# Patient Record
Sex: MALE | Race: WHITE | NOT HISPANIC OR LATINO | URBAN - METROPOLITAN AREA
[De-identification: names, ages, dates, MRNs, and addresses within clinical notes are randomized per-mention and may not be internally consistent; named-entity substitution may affect disease eponyms.]

---

## 2017-12-23 ENCOUNTER — EMERGENCY (EMERGENCY)
Facility: HOSPITAL | Age: 77
LOS: 1 days | Discharge: HOME | End: 2017-12-23
Attending: EMERGENCY MEDICINE | Admitting: EMERGENCY MEDICINE
Payer: MEDICARE

## 2017-12-23 VITALS
DIASTOLIC BLOOD PRESSURE: 77 MMHG | WEIGHT: 162.04 LBS | SYSTOLIC BLOOD PRESSURE: 134 MMHG | TEMPERATURE: 98 F | HEIGHT: 70 IN | RESPIRATION RATE: 16 BRPM | HEART RATE: 80 BPM | OXYGEN SATURATION: 98 %

## 2017-12-23 VITALS
OXYGEN SATURATION: 98 % | DIASTOLIC BLOOD PRESSURE: 74 MMHG | SYSTOLIC BLOOD PRESSURE: 134 MMHG | RESPIRATION RATE: 18 BRPM | HEART RATE: 80 BPM
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
## 2017-12-23 DIAGNOSIS — S01.01XA LACERATION WITHOUT FOREIGN BODY OF SCALP, INITIAL ENCOUNTER: ICD-10-CM

## 2017-12-23 DIAGNOSIS — E78.00 PURE HYPERCHOLESTEROLEMIA, UNSPECIFIED: ICD-10-CM

## 2017-12-23 DIAGNOSIS — Z23 ENCOUNTER FOR IMMUNIZATION: ICD-10-CM
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
## 2017-12-23 PROCEDURE — 12001 RPR S/N/AX/GEN/TRNK 2.5CM/<: CPT

## 2017-12-23 PROCEDURE — 70450 CT HEAD/BRAIN W/O DYE: CPT | Mod: 26

## 2017-12-23 PROCEDURE — 90715 TDAP VACCINE 7 YRS/> IM: CPT

## 2017-12-23 PROCEDURE — 99284 EMERGENCY DEPT VISIT MOD MDM: CPT | Mod: 25

## 2017-12-23 PROCEDURE — 70450 CT HEAD/BRAIN W/O DYE: CPT

## 2017-12-23 PROCEDURE — 90471 IMMUNIZATION ADMIN: CPT

## 2017-12-23 RX ORDER — BACITRACIN ZINC 500 UNIT/G
1 OINTMENT IN PACKET (EA) TOPICAL ONCE
Qty: 0 | Refills: 0 | Status: COMPLETED | OUTPATIENT
Start: 2017-12-23 | End: 2017-12-23

## 2017-12-23 RX ORDER — TETANUS TOXOID, REDUCED DIPHTHERIA TOXOID AND ACELLULAR PERTUSSIS VACCINE, ADSORBED 5; 2.5; 8; 8; 2.5 [IU]/.5ML; [IU]/.5ML; UG/.5ML; UG/.5ML; UG/.5ML
0.5 SUSPENSION INTRAMUSCULAR ONCE
Qty: 0 | Refills: 0 | Status: COMPLETED | OUTPATIENT
Start: 2017-12-23 | End: 2017-12-23

## 2017-12-23 RX ADMIN — TETANUS TOXOID, REDUCED DIPHTHERIA TOXOID AND ACELLULAR PERTUSSIS VACCINE, ADSORBED 0.5 MILLILITER(S): 5; 2.5; 8; 8; 2.5 SUSPENSION INTRAMUSCULAR at 10:45

## 2017-12-23 RX ADMIN — Medication 1 APPLICATION(S): at 10:46

## 2017-12-23 NOTE — ED PROVIDER NOTE - MEDICAL DECISION MAKING DETAILS
No ICH of skull fx, laceration cleaned, irrigated and closed w/staples, sterile dressing applied, neuro intact feeling well, safe to d/c home with PMD f/u. given wound care instructions, verbalized understanding.

## 2017-12-23 NOTE — ED PROVIDER NOTE - PHYSICAL EXAMINATION
VITAL SIGNS: I have reviewed nursing notes and confirm.  CONSTITUTIONAL: Well-developed; well-nourished; in no acute distress.  SKIN: Agree with RN documentation regarding decubitus evaluation. Remainder of skin exam is warm and dry, no acute rash.  HEAD: Normocephalic; + 4cm linear laceration deep to fascia occipital scalp w/out foreign body or pulsatile bleeding, no skeletal abnormality appreciated  EYES: PERRL, EOM intact; conjunctiva and sclera clear.  ENT: No nasal discharge; airway clear.  NECK: Supple; non tender.  CARD: S1, S2 normal; no murmurs, gallops, or rubs. Regular rate and rhythm.  RESP: No wheezes, rales or rhonchi.  ABD: Normal bowel sounds; soft; non-distended; non-tender; no hepatosplenomegaly.  EXT: Normal ROM. No clubbing, cyanosis or edema.  LYMPH: No acute cervical adenopathy.  NEURO: Alert, oriented. Grossly unremarkable.  PSYCH: Cooperative, appropriate.

## 2017-12-23 NOTE — ED ADULT TRIAGE NOTE - OTHER COMPLAINTS
pt c.o laceration to head after hitting head on the trunk of a taxi. pt actively bleeding, pressure dsg applied. takes daily asa. pt c.o laceration to head after hitting head on the trunk of a taxi. pt actively bleeding, pressure dsg applied. takes daily asa. no loc.

## 2017-12-23 NOTE — ED PROVIDER NOTE - OBJECTIVE STATEMENT
78 y/o M on baby asa p/w laceration crown of scalp after hitting the trunk of a taxi cab trying to lift out some luggage from the trunk, + active bleeding, no HA/visual changes or pre-syncope. States he has minor pain at site w/out any other injury. Occurred 30 min ago, unknown last tetanus.

## 2017-12-23 NOTE — ED ADULT NURSE NOTE - OTHER COMPLAINTS
pt c.o laceration to head after hitting head on the trunk of a taxi. pt actively bleeding, pressure dsg applied. takes daily asa. no loc.

## 2018-09-14 NOTE — ED PROVIDER NOTE - PMH
Hypercholesterolemia  Hypercholesteremia dysphagia 2 mechanical soft consistencies with nectar thickened liquids

## 2020-10-24 ENCOUNTER — INPATIENT (INPATIENT)
Facility: HOSPITAL | Age: 80
LOS: 2 days | Discharge: HOME | DRG: 244 | End: 2020-10-27
Attending: INTERNAL MEDICINE | Admitting: INTERNAL MEDICINE
Payer: MEDICARE

## 2020-10-24 VITALS
TEMPERATURE: 98 F | DIASTOLIC BLOOD PRESSURE: 87 MMHG | OXYGEN SATURATION: 96 % | SYSTOLIC BLOOD PRESSURE: 152 MMHG | HEART RATE: 76 BPM | WEIGHT: 162.04 LBS | HEIGHT: 70 IN | RESPIRATION RATE: 16 BRPM

## 2020-10-24 DIAGNOSIS — E78.5 HYPERLIPIDEMIA, UNSPECIFIED: ICD-10-CM

## 2020-10-24 DIAGNOSIS — Z98.890 OTHER SPECIFIED POSTPROCEDURAL STATES: Chronic | ICD-10-CM

## 2020-10-24 DIAGNOSIS — R55 SYNCOPE AND COLLAPSE: ICD-10-CM

## 2020-10-24 LAB
ALBUMIN SERPL ELPH-MCNC: 4.1 G/DL — SIGNIFICANT CHANGE UP (ref 3.3–5)
ALP SERPL-CCNC: 78 U/L — SIGNIFICANT CHANGE UP (ref 40–120)
ALT FLD-CCNC: 13 U/L — SIGNIFICANT CHANGE UP (ref 10–45)
ANION GAP SERPL CALC-SCNC: 10 MMOL/L — SIGNIFICANT CHANGE UP (ref 5–17)
ANION GAP SERPL CALC-SCNC: 10 MMOL/L — SIGNIFICANT CHANGE UP (ref 5–17)
APPEARANCE UR: CLEAR — SIGNIFICANT CHANGE UP
AST SERPL-CCNC: 19 U/L — SIGNIFICANT CHANGE UP (ref 10–40)
BASOPHILS # BLD AUTO: 0.04 K/UL — SIGNIFICANT CHANGE UP (ref 0–0.2)
BASOPHILS NFR BLD AUTO: 0.6 % — SIGNIFICANT CHANGE UP (ref 0–2)
BILIRUB SERPL-MCNC: 0.6 MG/DL — SIGNIFICANT CHANGE UP (ref 0.2–1.2)
BILIRUB UR-MCNC: NEGATIVE — SIGNIFICANT CHANGE UP
BUN SERPL-MCNC: 19 MG/DL — SIGNIFICANT CHANGE UP (ref 7–23)
BUN SERPL-MCNC: 22 MG/DL — SIGNIFICANT CHANGE UP (ref 7–23)
CALCIUM SERPL-MCNC: 9.4 MG/DL — SIGNIFICANT CHANGE UP (ref 8.4–10.5)
CALCIUM SERPL-MCNC: 9.4 MG/DL — SIGNIFICANT CHANGE UP (ref 8.4–10.5)
CHLORIDE SERPL-SCNC: 100 MMOL/L — SIGNIFICANT CHANGE UP (ref 96–108)
CHLORIDE SERPL-SCNC: 104 MMOL/L — SIGNIFICANT CHANGE UP (ref 96–108)
CO2 SERPL-SCNC: 26 MMOL/L — SIGNIFICANT CHANGE UP (ref 22–31)
CO2 SERPL-SCNC: 29 MMOL/L — SIGNIFICANT CHANGE UP (ref 22–31)
COLOR SPEC: YELLOW — SIGNIFICANT CHANGE UP
CREAT SERPL-MCNC: 1.04 MG/DL — SIGNIFICANT CHANGE UP (ref 0.5–1.3)
CREAT SERPL-MCNC: 1.16 MG/DL — SIGNIFICANT CHANGE UP (ref 0.5–1.3)
DIFF PNL FLD: NEGATIVE — SIGNIFICANT CHANGE UP
EOSINOPHIL # BLD AUTO: 0.13 K/UL — SIGNIFICANT CHANGE UP (ref 0–0.5)
EOSINOPHIL NFR BLD AUTO: 2 % — SIGNIFICANT CHANGE UP (ref 0–6)
GLUCOSE SERPL-MCNC: 104 MG/DL — HIGH (ref 70–99)
GLUCOSE SERPL-MCNC: 86 MG/DL — SIGNIFICANT CHANGE UP (ref 70–99)
GLUCOSE UR QL: NEGATIVE — SIGNIFICANT CHANGE UP
HCT VFR BLD CALC: 41.9 % — SIGNIFICANT CHANGE UP (ref 39–50)
HGB BLD-MCNC: 13.6 G/DL — SIGNIFICANT CHANGE UP (ref 13–17)
IMM GRANULOCYTES NFR BLD AUTO: 0.2 % — SIGNIFICANT CHANGE UP (ref 0–1.5)
KETONES UR-MCNC: NEGATIVE — SIGNIFICANT CHANGE UP
LACTATE SERPL-SCNC: 1 MMOL/L — SIGNIFICANT CHANGE UP (ref 0.5–2)
LEUKOCYTE ESTERASE UR-ACNC: NEGATIVE — SIGNIFICANT CHANGE UP
LYMPHOCYTES # BLD AUTO: 1.28 K/UL — SIGNIFICANT CHANGE UP (ref 1–3.3)
LYMPHOCYTES # BLD AUTO: 19.5 % — SIGNIFICANT CHANGE UP (ref 13–44)
MCHC RBC-ENTMCNC: 31.1 PG — SIGNIFICANT CHANGE UP (ref 27–34)
MCHC RBC-ENTMCNC: 32.5 GM/DL — SIGNIFICANT CHANGE UP (ref 32–36)
MCV RBC AUTO: 95.7 FL — SIGNIFICANT CHANGE UP (ref 80–100)
MONOCYTES # BLD AUTO: 0.48 K/UL — SIGNIFICANT CHANGE UP (ref 0–0.9)
MONOCYTES NFR BLD AUTO: 7.3 % — SIGNIFICANT CHANGE UP (ref 2–14)
NEUTROPHILS # BLD AUTO: 4.61 K/UL — SIGNIFICANT CHANGE UP (ref 1.8–7.4)
NEUTROPHILS NFR BLD AUTO: 70.4 % — SIGNIFICANT CHANGE UP (ref 43–77)
NITRITE UR-MCNC: NEGATIVE — SIGNIFICANT CHANGE UP
NRBC # BLD: 0 /100 WBCS — SIGNIFICANT CHANGE UP (ref 0–0)
PH UR: 6.5 — SIGNIFICANT CHANGE UP (ref 5–8)
PLATELET # BLD AUTO: 317 K/UL — SIGNIFICANT CHANGE UP (ref 150–400)
POTASSIUM SERPL-MCNC: 4.2 MMOL/L — SIGNIFICANT CHANGE UP (ref 3.5–5.3)
POTASSIUM SERPL-MCNC: 5.1 MMOL/L — SIGNIFICANT CHANGE UP (ref 3.5–5.3)
POTASSIUM SERPL-SCNC: 4.2 MMOL/L — SIGNIFICANT CHANGE UP (ref 3.5–5.3)
POTASSIUM SERPL-SCNC: 5.1 MMOL/L — SIGNIFICANT CHANGE UP (ref 3.5–5.3)
PROT SERPL-MCNC: 6.9 G/DL — SIGNIFICANT CHANGE UP (ref 6–8.3)
PROT UR-MCNC: NEGATIVE MG/DL — SIGNIFICANT CHANGE UP
RBC # BLD: 4.38 M/UL — SIGNIFICANT CHANGE UP (ref 4.2–5.8)
RBC # FLD: 12.9 % — SIGNIFICANT CHANGE UP (ref 10.3–14.5)
SARS-COV-2 RNA SPEC QL NAA+PROBE: SIGNIFICANT CHANGE UP
SODIUM SERPL-SCNC: 139 MMOL/L — SIGNIFICANT CHANGE UP (ref 135–145)
SODIUM SERPL-SCNC: 140 MMOL/L — SIGNIFICANT CHANGE UP (ref 135–145)
SP GR SPEC: <=1.005 — SIGNIFICANT CHANGE UP (ref 1–1.03)
TROPONIN T SERPL-MCNC: <0.01 NG/ML — SIGNIFICANT CHANGE UP (ref 0–0.01)
TROPONIN T SERPL-MCNC: <0.01 NG/ML — SIGNIFICANT CHANGE UP (ref 0–0.01)
UROBILINOGEN FLD QL: 0.2 E.U./DL — SIGNIFICANT CHANGE UP
WBC # BLD: 6.55 K/UL — SIGNIFICANT CHANGE UP (ref 3.8–10.5)
WBC # FLD AUTO: 6.55 K/UL — SIGNIFICANT CHANGE UP (ref 3.8–10.5)

## 2020-10-24 PROCEDURE — 99291 CRITICAL CARE FIRST HOUR: CPT

## 2020-10-24 PROCEDURE — 99285 EMERGENCY DEPT VISIT HI MDM: CPT

## 2020-10-24 PROCEDURE — 70450 CT HEAD/BRAIN W/O DYE: CPT | Mod: 26

## 2020-10-24 RX ORDER — ATORVASTATIN CALCIUM 80 MG/1
1 TABLET, FILM COATED ORAL
Qty: 0 | Refills: 0 | DISCHARGE

## 2020-10-24 RX ORDER — ENOXAPARIN SODIUM 100 MG/ML
40 INJECTION SUBCUTANEOUS EVERY 24 HOURS
Refills: 0 | Status: DISCONTINUED | OUTPATIENT
Start: 2020-10-24 | End: 2020-10-26

## 2020-10-24 RX ORDER — ASPIRIN/CALCIUM CARB/MAGNESIUM 324 MG
1 TABLET ORAL
Qty: 0 | Refills: 0 | DISCHARGE

## 2020-10-24 RX ADMIN — ENOXAPARIN SODIUM 40 MILLIGRAM(S): 100 INJECTION SUBCUTANEOUS at 21:45

## 2020-10-24 NOTE — ED ADULT NURSE NOTE - OBJECTIVE STATEMENT
Pt presents s/p episode of LOC this am. Wife reports was dizzy and reported smelling food before suddenly losing consciousness and convulsing for about 1 min. Pt reports feeling intermittent dizziness for the last 3 days. Pt reprots recent hx of right ear polyp treated with abx, steroids, and fluticasone which he stopped 2 weeks ago.

## 2020-10-24 NOTE — H&P ADULT - ASSESSMENT
81 yo M with PMHx of HLD who presented to [facility] ED on 10/24/20 with reports of a witnessed syncopal episode which was preceded by dizziness and an odd smell. Pt was reportedly shaking for 1 minute, had no tongue biting, loss of bowel or bladder and pt recalls the entire episode.  Orthostatics negative. Troponin negative x 1, UA negative. ECG revealed NSR @ 64 bpm with APC's, no acute STT changes. CT head negative for acute intracranial abnormality. Pt is now admitted to [facility] telemetry for further management of syncope.

## 2020-10-24 NOTE — ED ADULT NURSE REASSESSMENT NOTE - NS ED NURSE REASSESS COMMENT FT1
Syncopal episode witnessed in ED by this RN. Episode captured on cardiac monitor. Pt was sitting at edge of bed awake, suddenly slumped over unresponsive. Asystole on the monitor, no palpable brachial pulse. Attempted to lift pt's legs onto bed with plan to initiated chest compressions when pt suddenly awoke and exclaimed "I cant believe it happened again!" Entire incident ~45 seconds. VS WNL following incident, pt awake and alert states he feels the same as before, EKG repeated, incident printed from monitor and scanned to chart. MD Manning and Cardiology fellow came to bedside for evaluation. Cardiac monitoring continued. Pt instructed not to walk around.

## 2020-10-24 NOTE — H&P ADULT - HISTORY OF PRESENT ILLNESS
81 yo M with PMHx of HLD who presented to [facility] ED on 10/24/20 with reports of multiple witnessed syncopal episodes today which were preceded by dizziness and an odd smell. Pt was reportedly shaking for 1 minute, had no tongue biting, loss of bowel or bladder and pt recalls the entire episode. Pt denies fever, chills, CP, SOB, PND/ orthopnea, LE edema, abdominal pain, N/V/D, sick contacts.     In the ED:  Vitals: T 97.6, HR 54-76, -152/61-87, RR 16-18, sPO2 98% on RA  Lab significant for: K 5.1, Cr 1.16, UA neg  CT head: no acute intracranial abnormality  EKG: Sinus rhythm with PACs  Patient was found to have an episode of sinus pause lasting 5 sec in the ED   79 yo M with PMHx of HLD who presented to [facility] ED for syncope. Pt started experiencing dizziness episodes since 1.5 mo ago, about once a day, non-positional, non-exertional. Today, patient had an episode of witnessed syncope at home, which was preceded by dizziness and an odor smell. Pt denies trauma from this episode. After arriving in the ED, patient had another witnessed syncope episode with sinus pause detected on cardia monitor. Pt regained consciousness after 45s. On arrival to CCU, patient denies fever, chills, CP, SOB, abdominal pain, N/V/D, sick contacts.     In the ED:  Vitals: T 97.6, HR 54-76, -152/61-87, RR 16-18, sPO2 98% on RA  Lab significant for: K 5.1, Cr 1.16, UA neg  CT head: no acute intracranial abnormality  EKG: Sinus rhythm with PACs  Patient was found to have an episode of sinus pause lasting 5 sec in the ED   79 yo M with PMHx of HLD who presented to [facility] ED for syncope. Pt started experiencing dizziness episodes since 1.5 mo ago, about once a day, non-positional, non-exertional. Today, patient had an episode of witnessed syncope at home, which was preceded by dizziness and an odor smell. Pt denies traumatic injury from this episode. After arriving in the ED, patient had another witnessed syncopal episode with sinus pause detected on cardia monitor. Pt regained consciousness after 45s. On arrival to CCU, patient denies fever, chills, CP, SOB, abdominal pain, N/V/D, sick contacts.     In the ED:  Vitals: T 97.6, HR 54-76, -152/61-87, RR 16-18, sPO2 98% on RA  Lab significant for: K 5.1, Cr 1.16, UA neg  CT head: no acute intracranial abnormality  EKG: Sinus rhythm with PACs  Patient was found to have an episode of sinus pause lasting 5 sec in the ED

## 2020-10-24 NOTE — H&P ADULT - PROBLEM SELECTOR PLAN 2
- F/u AM Lipid panel  - Continue home Atorvastatin 80mg PO QD    DVT PPX; Lovenox SQ  Dispo: Pending clinical evaluation

## 2020-10-24 NOTE — ED PROVIDER NOTE - CLINICAL SUMMARY MEDICAL DECISION MAKING FREE TEXT BOX
here w/ 3 episodes of LOC, syncope vs seizure, though i think more likely syncope. Will plan for admission for cardiac monitoring, further workup. Also discussed case w/ on call epilepsy to see if they can consult and help r/o seizure w/ EEG given atypical symptom of pt smelling something before losing conciousness. here w/ 3 episodes of LOC, syncope vs seizure, though i think more likely syncope. Will plan for admission for cardiac monitoring, further workup. Also discussed case w/ on call epilepsy to see if they can consult and help r/o seizure w/ EEG given atypical symptom of pt smelling something before losing consciousness.

## 2020-10-24 NOTE — ED PROVIDER NOTE - OBJECTIVE STATEMENT
81 yo M with PMHx of HLD who presented to [facility] ED on 10/24/20 with reports of multiple witnessed syncopal episodes by wife today which were preceded by dizziness and an odd smell. Pt was reportedly shaking for 1 minute, had no tongue biting, loss of bowel or bladder and pt recalls the entire episode. Pt denies fever, chills, CP, SOB, PND/ orthopnea, LE edema, abdominal pain, N/V/D, sick contacts. Wife describes as convulsive but pt comes back with no post ictal state immediately afterwards. Pt resistant to coming to the ED the first two times, but this was the worst episode, so wife able to drag pt to ED for evaluation. Pt denies recent illness. No precipitating factors. Has never syncopized or had seizures before in his life.

## 2020-10-24 NOTE — H&P ADULT - HISTORY OF PRESENT ILLNESS
79 yo M with PMHx of HLD who presented to [facility] ED on 10/24/20 with reports of a witnessed syncopal episode which was preceded by dizziness and an odd smell. Pt was reportedly shaking for 1 minute, had no tongue biting, loss of bowel or bladder and pt recalls the entire episode. Pt denies fever, chills, CP, SOB, PND/ orthopnea, LE edema, abdominal pain, N/V/D, sick contacts.   In the ED VS T 97.6F, /87, HR 76, RR 16, O2 sat 96% on RA. Orthostatics negative. Labs significant for troponin negative x 1, UA negative, lactate negative. ECG revealed NSR @ 64 bpm with APC's, no acute STT changes. CT head negative for acute intracranial abnormality. Pt is now admitted to [facility] telemetry for further management of syncope. NOT COMPLETED AS PT STEPPED UP TO THE CCU FROM THE ED    81 yo M with PMHx of HLD who presented to [facility] ED on 10/24/20 with reports of multiple witnessed syncopal episodes today which were preceded by dizziness and an odd smell. Pt was reportedly shaking for 1 minute, had no tongue biting, loss of bowel or bladder and pt recalls the entire episode. Pt denies fever, chills, CP, SOB, PND/ orthopnea, LE edema, abdominal pain, N/V/D, sick contacts.   In the ED VS T 97.6F, /87, HR 76, RR 16, O2 sat 96% on RA. Orthostatics negative. Labs significant for troponin negative x 1, UA negative, lactate negative. ECG revealed NSR @ 64 bpm with APC's, no acute STT changes. CT head negative for acute intracranial abnormality. Pt is now admitted to [facility] telemetry for further management of syncope.

## 2020-10-24 NOTE — H&P ADULT - PROBLEM SELECTOR PLAN 1
Pt presented s/p witnessed syncopal event with prodromal symptoms, no post ictal state, HD stable, no dizziness at present  - CT head 10/24/20 revealed no acute intracranial abnormality.   - Orthostatics negative in ED, f/u repeat in AM  - ECHO ordered, f/u results  - UA negative, lactate negative   - Monitor tele

## 2020-10-24 NOTE — H&P ADULT - NSICDXFAMILYHX_GEN_ALL_CORE_FT
FAMILY HISTORY:  Family history of malignant neoplasm, Family history of cancer  Family history of neurological disorder, Family history of cerebral hemorrhage    
FAMILY HISTORY:  Family history of malignant neoplasm, Family history of cancer  Family history of neurological disorder, Family history of cerebral hemorrhage

## 2020-10-24 NOTE — ED ADULT NURSE NOTE - NSIMPLEMENTINTERV_GEN_ALL_ED
Implemented All Universal Safety Interventions:  [place] to call system. Call bell, personal items and telephone within reach. Instruct patient to call for assistance. Room bathroom lighting operational. Non-slip footwear when patient is off stretcher. Physically safe environment: no spills, clutter or unnecessary equipment. Stretcher in lowest position, wheels locked, appropriate side rails in place.

## 2020-10-24 NOTE — H&P ADULT - ASSESSMENT
CARDIOVASCULAR  # Sinus pause  Pt found to have     PULM  JESIKA    GI  JESIKA    ENDOCRINE    RENAL    ID    HEME    METABOLIC    SKIN  # lines    PREVENTATIVE  F: None  E: replete when K<4, Mg<2   N: NPO  DVT ppx:   GI ppx: None  Dispo: CCU  Code status: FULL 79 yo M with PMHx of HLD who presented to [facility] ED after witnessed syncope episodes, found to have sinus pause in the ED, admitted to CCU for further management.     CARDIOVASCULAR  # Syncope 2/2 sinus pause  Pt had witnessed syncope episodes, found to have sinus pause lasting 5s in the ED.   - admission EKG showed sinus rhythm with PACs  - pacer pads  - f/u Echo  - EP consult in the AM  - continue monitor    # Hyperlipidemia  -c/w atorvastatin 80  -f/u lipid panel    PULM  JESIKA    GI  JESIKA    ENDOCRINE  -ISS  -f/u A1c    RENAL  JESIKA    PREVENTATIVE  F: None  E: replete when K<4, Mg<2   N: NPO after midnight for possible EP intervention  DVT ppx: Lovenox 40mg QD and SCD  GI ppx: None  Dispo: CCU  Code status: FULL 81 yo M with PMHx of HLD who presented to [facility] ED after witnessed syncope episodes, found to have sinus pause in the ED, admitted to CCU for further management.     CARDIOVASCULAR  # Syncope 2/2 sinus pause  Pt had witnessed syncope episodes, found to have sinus pause lasting 5s in the ED.   - admission EKG showed sinus rhythm with PACs  - pacer pads  - card fellow spoked with Echo attending, bedside Echo showed grossly normal biventricular function, possible mild MR  - f/u formal Echo  - spoked with EP, recommended pacemaker on Monday (NPO on Sunday night)  - continue monitor  - avoiding AV raji blocking agents  - f/u repeat Trop/BMP    # Hyperlipidemia  -c/w atorvastatin 80  -f/u lipid panel    PULM  JESIKA    GI  JESIKA    ENDOCRINE  -ISS  -f/u A1c    RENAL  JESIKA    PREVENTATIVE  F: None  E: replete when K<4, Mg<2   N: DASH/TLC  DVT ppx: Lovenox 40mg QD and SCD  GI ppx: None  Dispo: CCU  Code status: FULL

## 2020-10-24 NOTE — H&P ADULT - NSHPPHYSICALEXAM_GEN_ALL_CORE
.  VITAL SIGNS:  T(C): 36.4 (10-24-20 @ 16:13), Max: 36.4 (10-24-20 @ 13:12)  T(F): 97.5 (10-24-20 @ 16:13), Max: 97.6 (10-24-20 @ 13:12)  HR: 54 (10-24-20 @ 17:20) (54 - 76)  BP: 135/77 (10-24-20 @ 17:20) (114/61 - 152/87)  BP(mean): --  RR: 18 (10-24-20 @ 17:20) (16 - 18)  SpO2: 95% (10-24-20 @ 17:20) (95% - 99%)  Wt(kg): --    PHYSICAL EXAM:    Constitutional: WDWN resting comfortably in bed; NAD  Head: NC/AT  Eyes: PERRL, EOMI, anicteric sclera  ENT: no nasal discharge; uvula midline, no oropharyngeal erythema or exudates; MMM  Neck: supple; no JVD or thyromegaly  Respiratory: CTA B/L; no W/R/R, no retractions  Cardiac: +S1/S2; RRR; no M/R/G; PMI non-displaced  Gastrointestinal: soft, NT/ND; no rebound or guarding; +BSx4  Genitourinary: normal external genitalia  Back: spine midline, no bony tenderness or step-offs; no CVAT B/L  Extremities: WWP, no clubbing or cyanosis; no peripheral edema  Musculoskeletal: NROM x4; no joint swelling, tenderness or erythema  Vascular: 2+ radial, femoral, DP/PT pulses B/L  Dermatologic: skin warm, dry and intact; no rashes, wounds, or scars  Lymphatic: no submandibular or cervical LAD  Neurologic: AAOx3; CNII-XII grossly intact; no focal deficits  Psychiatric: affect and characteristics of appearance, verbalizations, behaviors are appropriate .  VITAL SIGNS:  T(C): 36.4 (10-24-20 @ 16:13), Max: 36.4 (10-24-20 @ 13:12)  T(F): 97.5 (10-24-20 @ 16:13), Max: 97.6 (10-24-20 @ 13:12)  HR: 54 (10-24-20 @ 17:20) (54 - 76)  BP: 135/77 (10-24-20 @ 17:20) (114/61 - 152/87)  BP(mean): --  RR: 18 (10-24-20 @ 17:20) (16 - 18)  SpO2: 95% (10-24-20 @ 17:20) (95% - 99%)  Wt(kg): --    PHYSICAL EXAM:    Constitutional: WDWN resting comfortably in bed; NAD  Head: NC/AT  Eyes: PERRL, EOMI, anicteric sclera  ENT: no nasal discharge; MMM  Neck: supple; no JVD  Respiratory: CTA B/L; no W/R/R, no retractions  Cardiac: irregular rhythm, +S1/S2; no M/R/G; no carotid bruise  Gastrointestinal: soft, NT/ND; no rebound or guarding; +BSx4  Extremities: WWP, no clubbing or cyanosis; no peripheral edema  Vascular: 2+ radial, femoral, DP/PT pulses B/L  Neurologic: AAOx3; CNII-XII grossly intact; no focal deficits

## 2020-10-24 NOTE — ED ADULT TRIAGE NOTE - CHIEF COMPLAINT QUOTE
Pt's wife reports episode of syncope with bilateral arm "shaking" after smelling "something like food." Pt's wife states it happened once last week but lasted longer today. Patient denies chest pain, shortness of breath, abdominal pain, n/v/d, fevers. Awake and alert.

## 2020-10-24 NOTE — H&P ADULT - NSHPLABSRESULTS_GEN_ALL_CORE
.  LABS:                         13.6   6.55  )-----------( 317      ( 24 Oct 2020 14:04 )             41.9     10-24    139  |  100  |  22  ----------------------------<  104<H>  5.1   |  29  |  1.16    Ca    9.4      24 Oct 2020 14:04    TPro  6.9  /  Alb  4.1  /  TBili  0.6  /  DBili  x   /  AST  19  /  ALT  13  /  AlkPhos  78  10-24      Urinalysis Basic - ( 24 Oct 2020 16:53 )    Color: Yellow / Appearance: Clear / SG: <=1.005 / pH: x  Gluc: x / Ketone: NEGATIVE  / Bili: Negative / Urobili: 0.2 E.U./dL   Blood: x / Protein: NEGATIVE mg/dL / Nitrite: NEGATIVE   Leuk Esterase: NEGATIVE / RBC: x / WBC x   Sq Epi: x / Non Sq Epi: x / Bacteria: x      CARDIAC MARKERS ( 24 Oct 2020 14:04 )  x     / <0.01 ng/mL / x     / x     / x            Lactate, Blood: 1.0 mmol/L (10-24 @ 14:04)      RADIOLOGY, EKG & ADDITIONAL TESTS: Reviewed.
ECG: NSR @ 64bpm with APC's, no STT changes                        13.6   6.55  )-----------( 317      ( 24 Oct 2020 14:04 )             41.9       10-24    139  |  100  |  22  ----------------------------<  104<H>  5.1   |  29  |  1.16    Ca    9.4      24 Oct 2020 14:04    TPro  6.9  /  Alb  4.1  /  TBili  0.6  /  DBili  x   /  AST  19  /  ALT  13  /  AlkPhos  78  10-24          CARDIAC MARKERS ( 24 Oct 2020 14:04 )  x     / <0.01 ng/mL / x     / x     / x            Urinalysis Basic - ( 24 Oct 2020 16:53 )    Color: Yellow / Appearance: Clear / SG: <=1.005 / pH: x  Gluc: x / Ketone: NEGATIVE  / Bili: Negative / Urobili: 0.2 E.U./dL   Blood: x / Protein: NEGATIVE mg/dL / Nitrite: NEGATIVE   Leuk Esterase: NEGATIVE / RBC: x / WBC x   Sq Epi: x / Non Sq Epi: x / Bacteria: x

## 2020-10-24 NOTE — H&P ADULT - ATTENDING COMMENTS
Critical Care Attestation    I have delivered 60 minutes of critical care to the patient excluding teaching and procedures.    Pt is an 81 y/o man c HLP who presents to the ED for syncope. Pt reports that he started experiencing dizziness about 1 months ago and had another witnessed episode the day of admission.    While in ED, 5.5 second pause was noted on tele and pt was disoriented for about 45 seconds surrounding the pause.    NCHCT: -ve  CXR: pending    afeb, HR 50, 114/54, 98%    ECG: sb @ 55 LAD, pAC    Hgb 14.2  Cr 1.03  Sakina -ve    - pt with symptomatic sinus pauses for PPM next week  - f/u TTE  - consider TVP if pt with recurrent symptomatic pauses  - discuss timing of PPM with EP  - cont Lipitor and other home meds, hold any imani inducing meds

## 2020-10-25 LAB
A1C WITH ESTIMATED AVERAGE GLUCOSE RESULT: 5.7 % — HIGH (ref 4–5.6)
ANION GAP SERPL CALC-SCNC: 11 MMOL/L — SIGNIFICANT CHANGE UP (ref 5–17)
BASOPHILS # BLD AUTO: 0.05 K/UL — SIGNIFICANT CHANGE UP (ref 0–0.2)
BASOPHILS NFR BLD AUTO: 0.7 % — SIGNIFICANT CHANGE UP (ref 0–2)
BUN SERPL-MCNC: 17 MG/DL — SIGNIFICANT CHANGE UP (ref 7–23)
CALCIUM SERPL-MCNC: 9 MG/DL — SIGNIFICANT CHANGE UP (ref 8.4–10.5)
CHLORIDE SERPL-SCNC: 106 MMOL/L — SIGNIFICANT CHANGE UP (ref 96–108)
CHOLEST SERPL-MCNC: 145 MG/DL — SIGNIFICANT CHANGE UP
CO2 SERPL-SCNC: 24 MMOL/L — SIGNIFICANT CHANGE UP (ref 22–31)
CREAT SERPL-MCNC: 1.03 MG/DL — SIGNIFICANT CHANGE UP (ref 0.5–1.3)
EOSINOPHIL # BLD AUTO: 0.27 K/UL — SIGNIFICANT CHANGE UP (ref 0–0.5)
EOSINOPHIL NFR BLD AUTO: 3.7 % — SIGNIFICANT CHANGE UP (ref 0–6)
ESTIMATED AVERAGE GLUCOSE: 117 MG/DL — HIGH (ref 68–114)
GLUCOSE SERPL-MCNC: 82 MG/DL — SIGNIFICANT CHANGE UP (ref 70–99)
HCT VFR BLD CALC: 38.9 % — LOW (ref 39–50)
HDLC SERPL-MCNC: 54 MG/DL — SIGNIFICANT CHANGE UP
HGB BLD-MCNC: 13 G/DL — SIGNIFICANT CHANGE UP (ref 13–17)
IMM GRANULOCYTES NFR BLD AUTO: 0.4 % — SIGNIFICANT CHANGE UP (ref 0–1.5)
LIPID PNL WITH DIRECT LDL SERPL: 72 MG/DL — SIGNIFICANT CHANGE UP
LYMPHOCYTES # BLD AUTO: 1.77 K/UL — SIGNIFICANT CHANGE UP (ref 1–3.3)
LYMPHOCYTES # BLD AUTO: 24.2 % — SIGNIFICANT CHANGE UP (ref 13–44)
MAGNESIUM SERPL-MCNC: 1.9 MG/DL — SIGNIFICANT CHANGE UP (ref 1.6–2.6)
MCHC RBC-ENTMCNC: 31.3 PG — SIGNIFICANT CHANGE UP (ref 27–34)
MCHC RBC-ENTMCNC: 33.4 GM/DL — SIGNIFICANT CHANGE UP (ref 32–36)
MCV RBC AUTO: 93.5 FL — SIGNIFICANT CHANGE UP (ref 80–100)
MONOCYTES # BLD AUTO: 0.58 K/UL — SIGNIFICANT CHANGE UP (ref 0–0.9)
MONOCYTES NFR BLD AUTO: 7.9 % — SIGNIFICANT CHANGE UP (ref 2–14)
NEUTROPHILS # BLD AUTO: 4.61 K/UL — SIGNIFICANT CHANGE UP (ref 1.8–7.4)
NEUTROPHILS NFR BLD AUTO: 63.1 % — SIGNIFICANT CHANGE UP (ref 43–77)
NON HDL CHOLESTEROL: 91 MG/DL — SIGNIFICANT CHANGE UP
NRBC # BLD: 0 /100 WBCS — SIGNIFICANT CHANGE UP (ref 0–0)
PLATELET # BLD AUTO: 281 K/UL — SIGNIFICANT CHANGE UP (ref 150–400)
POTASSIUM SERPL-MCNC: 4.1 MMOL/L — SIGNIFICANT CHANGE UP (ref 3.5–5.3)
POTASSIUM SERPL-SCNC: 4.1 MMOL/L — SIGNIFICANT CHANGE UP (ref 3.5–5.3)
RBC # BLD: 4.16 M/UL — LOW (ref 4.2–5.8)
RBC # FLD: 13.2 % — SIGNIFICANT CHANGE UP (ref 10.3–14.5)
SODIUM SERPL-SCNC: 141 MMOL/L — SIGNIFICANT CHANGE UP (ref 135–145)
TRIGL SERPL-MCNC: 97 MG/DL — SIGNIFICANT CHANGE UP
WBC # BLD: 7.31 K/UL — SIGNIFICANT CHANGE UP (ref 3.8–10.5)
WBC # FLD AUTO: 7.31 K/UL — SIGNIFICANT CHANGE UP (ref 3.8–10.5)

## 2020-10-25 PROCEDURE — 99291 CRITICAL CARE FIRST HOUR: CPT

## 2020-10-25 PROCEDURE — 71045 X-RAY EXAM CHEST 1 VIEW: CPT | Mod: 26

## 2020-10-25 RX ADMIN — ENOXAPARIN SODIUM 40 MILLIGRAM(S): 100 INJECTION SUBCUTANEOUS at 20:46

## 2020-10-26 ENCOUNTER — TRANSCRIPTION ENCOUNTER (OUTPATIENT)
Age: 80
End: 2020-10-26

## 2020-10-26 LAB
ANION GAP SERPL CALC-SCNC: 12 MMOL/L — SIGNIFICANT CHANGE UP (ref 5–17)
APTT BLD: 32.6 SEC — SIGNIFICANT CHANGE UP (ref 27.5–35.5)
BUN SERPL-MCNC: 22 MG/DL — SIGNIFICANT CHANGE UP (ref 7–23)
CALCIUM SERPL-MCNC: 8.9 MG/DL — SIGNIFICANT CHANGE UP (ref 8.4–10.5)
CHLORIDE SERPL-SCNC: 103 MMOL/L — SIGNIFICANT CHANGE UP (ref 96–108)
CO2 SERPL-SCNC: 25 MMOL/L — SIGNIFICANT CHANGE UP (ref 22–31)
CREAT SERPL-MCNC: 1.2 MG/DL — SIGNIFICANT CHANGE UP (ref 0.5–1.3)
GLUCOSE SERPL-MCNC: 90 MG/DL — SIGNIFICANT CHANGE UP (ref 70–99)
HCT VFR BLD CALC: 40.1 % — SIGNIFICANT CHANGE UP (ref 39–50)
HGB BLD-MCNC: 13.2 G/DL — SIGNIFICANT CHANGE UP (ref 13–17)
INR BLD: 1.06 — SIGNIFICANT CHANGE UP (ref 0.88–1.16)
MAGNESIUM SERPL-MCNC: 1.8 MG/DL — SIGNIFICANT CHANGE UP (ref 1.6–2.6)
MCHC RBC-ENTMCNC: 31.1 PG — SIGNIFICANT CHANGE UP (ref 27–34)
MCHC RBC-ENTMCNC: 32.9 GM/DL — SIGNIFICANT CHANGE UP (ref 32–36)
MCV RBC AUTO: 94.6 FL — SIGNIFICANT CHANGE UP (ref 80–100)
NRBC # BLD: 0 /100 WBCS — SIGNIFICANT CHANGE UP (ref 0–0)
PHOSPHATE SERPL-MCNC: 2.9 MG/DL — SIGNIFICANT CHANGE UP (ref 2.5–4.5)
PLATELET # BLD AUTO: 281 K/UL — SIGNIFICANT CHANGE UP (ref 150–400)
POTASSIUM SERPL-MCNC: 4.6 MMOL/L — SIGNIFICANT CHANGE UP (ref 3.5–5.3)
POTASSIUM SERPL-SCNC: 4.6 MMOL/L — SIGNIFICANT CHANGE UP (ref 3.5–5.3)
PROTHROM AB SERPL-ACNC: 12.7 SEC — SIGNIFICANT CHANGE UP (ref 10.6–13.6)
RBC # BLD: 4.24 M/UL — SIGNIFICANT CHANGE UP (ref 4.2–5.8)
RBC # FLD: 12.9 % — SIGNIFICANT CHANGE UP (ref 10.3–14.5)
SODIUM SERPL-SCNC: 140 MMOL/L — SIGNIFICANT CHANGE UP (ref 135–145)
WBC # BLD: 7.61 K/UL — SIGNIFICANT CHANGE UP (ref 3.8–10.5)
WBC # FLD AUTO: 7.61 K/UL — SIGNIFICANT CHANGE UP (ref 3.8–10.5)

## 2020-10-26 PROCEDURE — 99291 CRITICAL CARE FIRST HOUR: CPT

## 2020-10-26 PROCEDURE — 93308 TTE F-UP OR LMTD: CPT | Mod: 26

## 2020-10-26 PROCEDURE — 33208 INSRT HEART PM ATRIAL & VENT: CPT | Mod: KX

## 2020-10-26 PROCEDURE — 99223 1ST HOSP IP/OBS HIGH 75: CPT

## 2020-10-26 RX ORDER — CEFAZOLIN SODIUM 1 G
1000 VIAL (EA) INJECTION ONCE
Refills: 0 | Status: DISCONTINUED | OUTPATIENT
Start: 2020-10-26 | End: 2020-10-26

## 2020-10-26 RX ORDER — ATORVASTATIN CALCIUM 80 MG/1
80 TABLET, FILM COATED ORAL AT BEDTIME
Refills: 0 | Status: DISCONTINUED | OUTPATIENT
Start: 2020-10-26 | End: 2020-10-27

## 2020-10-26 RX ORDER — CEFAZOLIN SODIUM 1 G
2000 VIAL (EA) INJECTION EVERY 8 HOURS
Refills: 0 | Status: COMPLETED | OUTPATIENT
Start: 2020-10-26 | End: 2020-10-26

## 2020-10-26 RX ORDER — ACETAMINOPHEN 500 MG
650 TABLET ORAL ONCE
Refills: 0 | Status: COMPLETED | OUTPATIENT
Start: 2020-10-26 | End: 2020-10-26

## 2020-10-26 RX ORDER — VANCOMYCIN HCL 1 G
1250 VIAL (EA) INTRAVENOUS ONCE
Refills: 0 | Status: COMPLETED | OUTPATIENT
Start: 2020-10-26 | End: 2020-10-26

## 2020-10-26 RX ORDER — VANCOMYCIN HCL 1 G
1000 VIAL (EA) INTRAVENOUS ONCE
Refills: 0 | Status: DISCONTINUED | OUTPATIENT
Start: 2020-10-26 | End: 2020-10-26

## 2020-10-26 RX ORDER — MAGNESIUM SULFATE 500 MG/ML
2 VIAL (ML) INJECTION ONCE
Refills: 0 | Status: COMPLETED | OUTPATIENT
Start: 2020-10-26 | End: 2020-10-26

## 2020-10-26 RX ORDER — CEFAZOLIN SODIUM 1 G
2000 VIAL (EA) INJECTION ONCE
Refills: 0 | Status: COMPLETED | OUTPATIENT
Start: 2020-10-26 | End: 2020-10-26

## 2020-10-26 RX ADMIN — Medication 50 GRAM(S): at 06:23

## 2020-10-26 RX ADMIN — Medication 100 MILLIGRAM(S): at 14:35

## 2020-10-26 RX ADMIN — Medication 166.67 MILLIGRAM(S): at 14:35

## 2020-10-26 RX ADMIN — ATORVASTATIN CALCIUM 80 MILLIGRAM(S): 80 TABLET, FILM COATED ORAL at 21:43

## 2020-10-26 RX ADMIN — Medication 100 MILLIGRAM(S): at 14:49

## 2020-10-26 RX ADMIN — Medication 650 MILLIGRAM(S): at 22:30

## 2020-10-26 RX ADMIN — Medication 100 MILLIGRAM(S): at 21:43

## 2020-10-26 RX ADMIN — Medication 650 MILLIGRAM(S): at 21:46

## 2020-10-26 NOTE — CONSULT NOTE ADULT - ASSESSMENT
81 yo M with PMHx of HLD who presented to [facility] ED for syncope. Pt started experiencing dizziness episodes 1.5 mo ago, about once a day, non-positional, non-exertional. Today, patient had an episode of witnessed syncope at home, which was preceded by dizziness and an odor smell. Pt denies traumatic injury from this episode. After arriving in the ED, patient had another witnessed syncopal episode with sinus pause detected on cardia monitor. Pt regained consciousness after 45s. On arrival to CCU, patient denies fever, chills, CP, SOB, abdominal pain, N/V/D, sick contacts.     While in the ED the patient was noted to have a sinus pause of 5s. His rhythm has been SR with PACs. EP consulted for pacemaker implant.

## 2020-10-26 NOTE — PROGRESS NOTE ADULT - ASSESSMENT
80M PMH HLD who presented to [facility] ED after witnessed syncope episodes, found to have 5 second sinus pause in the ED, admitted to CCU for further management, pending pacemaker placement 10/26.     CARDIOVASCULAR  #Syncope 2/2 sinus pause. Witnessed syncopal episode in ED lasting 5 seconds. Admission EKG w sinus rhythm with PAC. Bedside echo grossly normal biventricular function.  -f/u formal echo   -Pacemaker today 10/26  -If patient has long sinus pause, start transvenous pacing. Pads are in place.      #HLD  -c/w atorvastatin 80 mg qhs     PREVENTATIVE  F: None  E: replete when K<4, Mg<2   N: DASH/TLC  DVT ppx: Lovenox 40mg QD and SCD  GI ppx: None  Dispo: CCU  Code status: FULL
80M PMH HLD who presented to [facility] ED after witnessed syncope episodes, found to have 5 second sinus pause in the ED, admitted to CCU for further management.     CARDIOVASCULAR  #Syncope 2/2 sinus pause. Witnessed syncopal episode in ED lasting 5 seconds. Admission EKG w sinus rhythm with PAC. Bedside echo grossly normal biventricular function.  -f/u formal echo   -Pacemaker on 10/26, NPO after midnight  -If patient has long sinus pause, start transvenous pacing. Pads are in place.      #HLD  -c/w atorvastatin 80    PREVENTATIVE  F: None  E: replete when K<4, Mg<2   N: DASH/TLC  DVT ppx: Lovenox 40mg QD and SCD  GI ppx: None  Dispo: CCU  Code status: FULL

## 2020-10-26 NOTE — DISCHARGE NOTE PROVIDER - NSDCFUADDAPPT_GEN_ALL_CORE_FT
Please follow up with Dr Roque, the electrophysiology doctor who placed your pacemaker, within 2 weeks of discharge. Also please follow up with your PCP, Dr Patel, within 2 weeks of discharge.

## 2020-10-26 NOTE — DISCHARGE NOTE PROVIDER - HOSPITAL COURSE
80M PMH HLD who presented to [facility] ED after witnessed syncope episodes, found to have 5 second sinus pause in the ED, admitted to CCU for further management. Admission EKG NSR with PACs. 10/25 pm, had 1 more sinus pause lasting 5 seconds that resolved without intervention. Pt was asymptomatic. Pt had dual chamber pacemaker placed 10/26, and was discharged on 10/27, hemodynamically stable without complications.     Problem List/Main Diagnoses (system-based):   1. Syncope 2/2 sinus pause - pt had dual chamber pacemaker placed 10/26    2. HLD - c/w statin     New medications: NA   Labs to be followed outpatient: -  Exam to be followed outpatient: cardiac exam    80M PMH HLD who presented to [facility] ED after witnessed syncope episodes, found to have 5 second sinus pause in the ED, admitted to CCU for further management. Admission EKG NSR with PACs. 10/25 pm, had 1 more sinus pause lasting 5 seconds that resolved without intervention. Pt was asymptomatic. Pt had dual chamber pacemaker placed 10/26. Pt was discharged after device was interrogated 10/27, hemodynamically stable without complications.     Problem List/Main Diagnoses (system-based):   1. Syncope 2/2 sinus pause - pt had dual chamber pacemaker placed 10/26. No complications     2. HLD - c/w statin     New medications: NA   Labs to be followed outpatient: -  Exam to be followed outpatient: cardiac exam

## 2020-10-26 NOTE — DISCHARGE NOTE PROVIDER - NSDCMRMEDTOKEN_GEN_ALL_CORE_FT
Adult Aspirin 81 mg oral tablet, chewable: 1 tab(s) orally once a day  atorvastatin 80 mg oral tablet: 1 tab(s) orally once a day

## 2020-10-26 NOTE — DISCHARGE NOTE PROVIDER - NSDCCPCAREPLAN_GEN_ALL_CORE_FT
PRINCIPAL DISCHARGE DIAGNOSIS  Diagnosis: LOC (loss of consciousness)  Assessment and Plan of Treatment: Your fainting episodes were because your heart had been stopping beating for 5 seconds at a time. To prevent these pauses of your heart, you had a pacemaker placed, which will prevent these episodes in the future       PRINCIPAL DISCHARGE DIAGNOSIS  Diagnosis: LOC (loss of consciousness)  Assessment and Plan of Treatment: Your fainting episodes were because your heart had been stopping beating for 5 seconds at a time. To prevent these pauses of your heart, you had a pacemaker placed, which will prevent these episodes in the future. Avoid lifting the L arm above the level of the shoulder for 1 month. Also avoid pushing, pulling, reaching or lifting anything over 5 lbs with the left arm for one month. You may shower, but don't scrub the incision and avoid tub baths and swimming for two weeks or until fully healed. Please call office with any questions. Follow up with Dr. Roque in our office on the 2nd floor of this hospital on November 19th at 2 pm.

## 2020-10-26 NOTE — CONSULT NOTE ADULT - PROBLEM SELECTOR RECOMMENDATION 9
Etiology: sinus pauses.   - Plan for dual chamber PPM implant today. Please keep the patient NPO. Avoid all blood thinners, including subcutaneous injections for at least 24 hours.   - Monitor pocket for bleeding/hematoma.  - CXR PA/lat tomorrow morning.  - EP to check device tomorrow morning.

## 2020-10-26 NOTE — CONSULT NOTE ADULT - SUBJECTIVE AND OBJECTIVE BOX
CHIEF COMPLAINT: Syncope    HISTORY OF PRESENT ILLNESS: 81 yo M with PMHx of HLD who presented to [facility] ED for syncope. Pt started experiencing dizziness episodes 1.5 mo ago, about once a day, non-positional, non-exertional. Today, patient had an episode of witnessed syncope at home, which was preceded by dizziness and an odor smell. Pt denies traumatic injury from this episode. After arriving in the ED, patient had another witnessed syncopal episode with sinus pause detected on cardia monitor. Pt regained consciousness after 45s. On arrival to CCU, patient denies fever, chills, CP, SOB, abdominal pain, N/V/D, sick contacts.     While in the ED the patient was noted to have a sinus pause of 5s. His rhythm has been SR with PACs. EP consulted for pacemaker implant.    PAST MEDICAL & SURGICAL HISTORY:  Hypercholesterolemia    H/O knee surgery    Allergies    No Known Allergies    MEDICATIONS:  atorvastatin 80 milliGRAM(s) Oral at bedtime  enoxaparin Injectable 40 milliGRAM(s) SubCutaneous every 24 hours    FAMILY HISTORY:  Family history of malignant neoplasm  Family history of cancer  Family history of neurological disorder  Family history of cerebral hemorrhage    SOCIAL HISTORY:    [x] Non-smoker  [ ] Smoker  [ ] Alcohol    REVIEW OF SYSTEMS:    CONSTITUTIONAL: No fever, weight loss, or fatigue  EYES: No eye pain, visual disturbances, or discharge  ENMT:  No difficulty hearing, tinnitus, vertigo; No sinus or throat pain  NECK: No pain or stiffness  BREASTS: No pain, masses, or nipple discharge  RESPIRATORY: No cough, wheezing, chills or hemoptysis; No Shortness of Breath  CARDIOVASCULAR: No chest pain, palpitations, dizziness, or leg swelling, + syncope  GASTROINTESTINAL: No abdominal or epigastric pain. No nausea, vomiting, or hematemesis; No diarrhea or constipation. No melena or hematochezia.  GENITOURINARY: No dysuria, frequency, hematuria, or incontinence  NEUROLOGICAL: No headaches, memory loss, loss of strength, numbness, or tremors  SKIN: No itching, burning, rashes, or lesions   LYMPH Nodes: No enlarged glands  ENDOCRINE: No heat or cold intolerance; No hair loss  MUSCULOSKELETAL: No joint pain or swelling; No muscle, back, or extremity pain  PSYCHIATRIC: No depression, anxiety, mood swings, or difficulty sleeping  HEME/LYMPH: No easy bruising, or bleeding gums  ALLERY AND IMMUNOLOGIC: No hives or eczema	    [x] All others negative	  [ ] Unable to obtain    PHYSICAL EXAM:  T(C): 36.7 (10-26-20 @ 09:00), Max: 36.9 (10-25-20 @ 10:00)  HR: 53 (10-26-20 @ 08:00) (53 - 90)  BP: 111/59 (10-26-20 @ 08:00) (93/56 - 137/75)  RR: 18 (10-26-20 @ 08:00) (14 - 26)  SpO2: 95% (10-26-20 @ 08:00) (95% - 100%)    I&O's Summary    25 Oct 2020 07:01  -  26 Oct 2020 07:00  --------------------------------------------------------  IN: 450 mL / OUT: 1820 mL / NET: -1370 mL    TELEMETRY: SR  	    ECG:     Appearance: Normal	  HEENT:   Normal oral mucosa, PERRL, EOMI	  Cardiovascular: Normal S1 S2, No JVD, No murmurs, No edema  Respiratory: Lungs clear to auscultation	  Gastrointestinal:  Soft, Non-tender, + BS	  Neurologic: A&O x 3, Non-focal  Extremities: Normal range of motion, No clubbing, cyanosis or edema  Vascular: Peripheral pulses palpable 2+ bilaterally    	  LABS:	 	                        13.2   7.61  )-----------( 281      ( 26 Oct 2020 05:37 )             40.1     10-26    140  |  103  |  22  ----------------------------<  90  4.6   |  25  |  1.20    Ca    8.9      26 Oct 2020 05:37  Phos  2.9     10-26  Mg     1.8     10-26    TPro  6.9  /  Alb  4.1  /  TBili  0.6  /  DBili  x   /  AST  19  /  ALT  13  /  AlkPhos  78  10-24    	     CHIEF COMPLAINT: Syncope    HISTORY OF PRESENT ILLNESS: 81 yo M with PMHx of HLD who presented to [facility] ED for syncope. Pt started experiencing dizziness episodes 1.5 mo ago, about once a day, non-positional, non-exertional. Today, patient had an episode of witnessed syncope at home, which was preceded by dizziness and an odor smell. Pt denies traumatic injury from this episode. After arriving in the ED, patient had another witnessed syncopal episode with sinus pause detected on cardia monitor. Pt regained consciousness after 45s. On arrival to CCU, patient denies fever, chills, CP, SOB, abdominal pain, N/V/D, sick contacts.     While in the ED the patient was noted to have a sinus pause of 5s. His rhythm has been SR with PACs. EP consulted for pacemaker implant.    PAST MEDICAL & SURGICAL HISTORY:  Hypercholesterolemia    H/O knee surgery    Allergies    No Known Allergies    MEDICATIONS:  atorvastatin 80 milliGRAM(s) Oral at bedtime  enoxaparin Injectable 40 milliGRAM(s) SubCutaneous every 24 hours    FAMILY HISTORY:  Family history of malignant neoplasm  Family history of cancer  Family history of neurological disorder  Family history of cerebral hemorrhage    SOCIAL HISTORY:    [x] Non-smoker  [ ] Smoker  [ ] Alcohol    REVIEW OF SYSTEMS:    CONSTITUTIONAL: No fever, weight loss, or fatigue  EYES: No eye pain, visual disturbances, or discharge  ENMT:  No difficulty hearing, tinnitus, vertigo; No sinus or throat pain  NECK: No pain or stiffness  BREASTS: No pain, masses, or nipple discharge  RESPIRATORY: No cough, wheezing, chills or hemoptysis; No Shortness of Breath  CARDIOVASCULAR: No chest pain, palpitations, dizziness, or leg swelling, + syncope  GASTROINTESTINAL: No abdominal or epigastric pain. No nausea, vomiting, or hematemesis; No diarrhea or constipation. No melena or hematochezia.  GENITOURINARY: No dysuria, frequency, hematuria, or incontinence  NEUROLOGICAL: No headaches, memory loss, loss of strength, numbness, or tremors  SKIN: No itching, burning, rashes, or lesions   LYMPH Nodes: No enlarged glands  ENDOCRINE: No heat or cold intolerance; No hair loss  MUSCULOSKELETAL: No joint pain or swelling; No muscle, back, or extremity pain  PSYCHIATRIC: No depression, anxiety, mood swings, or difficulty sleeping  HEME/LYMPH: No easy bruising, or bleeding gums  ALLERY AND IMMUNOLOGIC: No hives or eczema	    [x] All others negative	  [ ] Unable to obtain    PHYSICAL EXAM:  T(C): 36.7 (10-26-20 @ 09:00), Max: 36.9 (10-25-20 @ 10:00)  HR: 53 (10-26-20 @ 08:00) (53 - 90)  BP: 111/59 (10-26-20 @ 08:00) (93/56 - 137/75)  RR: 18 (10-26-20 @ 08:00) (14 - 26)  SpO2: 95% (10-26-20 @ 08:00) (95% - 100%)    I&O's Summary    25 Oct 2020 07:01  -  26 Oct 2020 07:00  --------------------------------------------------------  IN: 450 mL / OUT: 1820 mL / NET: -1370 mL    TELEMETRY: SR 60 bpm with brief PAT and 6s sinus pause at 22:41 last night (10/25)  	    ECG: SR 62 bpm with PACs, narrow QRS    Appearance: Normal	  HEENT: Normal oral mucosa, PERRL, EOMI	  Cardiovascular: Normal S1 S2, No JVD, No murmurs, No edema  Respiratory: Lungs clear to auscultation	  Gastrointestinal:  Soft, Non-tender, + BS	  Neurologic: A&O x 3, Non-focal  Extremities: Normal range of motion, No clubbing, cyanosis or edema  Vascular: Peripheral pulses palpable 2+ bilaterally    	  LABS:	 	                        13.2   7.61  )-----------( 281      ( 26 Oct 2020 05:37 )             40.1     10-26    140  |  103  |  22  ----------------------------<  90  4.6   |  25  |  1.20    Ca    8.9      26 Oct 2020 05:37  Phos  2.9     10-26  Mg     1.8     10-26    TPro  6.9  /  Alb  4.1  /  TBili  0.6  /  DBili  x   /  AST  19  /  ALT  13  /  AlkPhos  78  10-24

## 2020-10-26 NOTE — DISCHARGE NOTE PROVIDER - NSDCCPGOAL_GEN_ALL_CORE_FT
To get better and follow your care plan as instructed. To get better and follow your care plan as instructed. Avoid lifting the left arm above the level of the shoulder for one month. Also avoid pushing, pulling, reaching or lifting anything over 5lbs with the left arm for one month. You may shower, but don't scrub the incision and avoid tub baths and swimming for two weeks or until fully healed. Please call the office with any questions. Follow-up with Dr. Roque in our office on the second floor of this hospital on November 19 at 2pm.

## 2020-10-26 NOTE — DISCHARGE NOTE PROVIDER - CARE PROVIDER_API CALL
Michele Roque  Cardiac Electrophysiology  [address] [place] [address]  Phone: [phone]  Fax: [phone]  Follow Up Time:     Deandre Patel  INTERNAL MEDICINE  [address] [address]  Phone: [phone]  Fax: [phone]  Follow Up Time:

## 2020-10-27 ENCOUNTER — TRANSCRIPTION ENCOUNTER (OUTPATIENT)
Age: 80
End: 2020-10-27

## 2020-10-27 VITALS
SYSTOLIC BLOOD PRESSURE: 106 MMHG | RESPIRATION RATE: 24 BRPM | DIASTOLIC BLOOD PRESSURE: 64 MMHG | HEART RATE: 66 BPM | OXYGEN SATURATION: 98 %

## 2020-10-27 LAB
ANION GAP SERPL CALC-SCNC: 12 MMOL/L — SIGNIFICANT CHANGE UP (ref 5–17)
BUN SERPL-MCNC: 16 MG/DL — SIGNIFICANT CHANGE UP (ref 7–23)
CALCIUM SERPL-MCNC: 8.6 MG/DL — SIGNIFICANT CHANGE UP (ref 8.4–10.5)
CHLORIDE SERPL-SCNC: 100 MMOL/L — SIGNIFICANT CHANGE UP (ref 96–108)
CO2 SERPL-SCNC: 24 MMOL/L — SIGNIFICANT CHANGE UP (ref 22–31)
CREAT SERPL-MCNC: 1.15 MG/DL — SIGNIFICANT CHANGE UP (ref 0.5–1.3)
GLUCOSE SERPL-MCNC: 86 MG/DL — SIGNIFICANT CHANGE UP (ref 70–99)
HCT VFR BLD CALC: 39.8 % — SIGNIFICANT CHANGE UP (ref 39–50)
HGB BLD-MCNC: 13.2 G/DL — SIGNIFICANT CHANGE UP (ref 13–17)
MAGNESIUM SERPL-MCNC: 2 MG/DL — SIGNIFICANT CHANGE UP (ref 1.6–2.6)
MCHC RBC-ENTMCNC: 31.2 PG — SIGNIFICANT CHANGE UP (ref 27–34)
MCHC RBC-ENTMCNC: 33.2 GM/DL — SIGNIFICANT CHANGE UP (ref 32–36)
MCV RBC AUTO: 94.1 FL — SIGNIFICANT CHANGE UP (ref 80–100)
NRBC # BLD: 0 /100 WBCS — SIGNIFICANT CHANGE UP (ref 0–0)
PHOSPHATE SERPL-MCNC: 3.3 MG/DL — SIGNIFICANT CHANGE UP (ref 2.5–4.5)
PLATELET # BLD AUTO: 279 K/UL — SIGNIFICANT CHANGE UP (ref 150–400)
POTASSIUM SERPL-MCNC: 4.3 MMOL/L — SIGNIFICANT CHANGE UP (ref 3.5–5.3)
POTASSIUM SERPL-SCNC: 4.3 MMOL/L — SIGNIFICANT CHANGE UP (ref 3.5–5.3)
RBC # BLD: 4.23 M/UL — SIGNIFICANT CHANGE UP (ref 4.2–5.8)
RBC # FLD: 12.7 % — SIGNIFICANT CHANGE UP (ref 10.3–14.5)
SODIUM SERPL-SCNC: 136 MMOL/L — SIGNIFICANT CHANGE UP (ref 135–145)
WBC # BLD: 8.61 K/UL — SIGNIFICANT CHANGE UP (ref 3.8–10.5)
WBC # FLD AUTO: 8.61 K/UL — SIGNIFICANT CHANGE UP (ref 3.8–10.5)

## 2020-10-27 PROCEDURE — 71046 X-RAY EXAM CHEST 2 VIEWS: CPT | Mod: 26

## 2020-10-27 PROCEDURE — 99239 HOSP IP/OBS DSCHRG MGMT >30: CPT | Mod: GC

## 2020-10-27 PROCEDURE — 93280 PM DEVICE PROGR EVAL DUAL: CPT | Mod: 26

## 2020-10-27 NOTE — PROGRESS NOTE ADULT - ATTENDING COMMENTS
80M PMH HLD who presented to [facility] ED after witnessed syncope episodes, found to have 5 second sinus pause in the ED, admitted to CCU   pacemaker placed 10/26.     Device model: SJM Assurity MRI					  Implanting Physician: Dr. Roque  Functioning Mode: DDI 50			  Underlying Rhythm: SR  Pacemaker dependency: No  Battery status: 7-12 years  Lead parameters:   				  RA lead:    Sense:   2.0   mV.              Threshold:    1.0  V at 0.4   ms.           Impedance:   530 ohms  RV lead:    Sense:   7.3  mV.              Threshold:  0.25 V at  0.4 ms.           Impedance:  600 ohms    Events/Alert: None. AP 2.9%,  0%     Normally functioning device as programmed. The incision is healing well - approximated, without oozing or hematoma. Dermabond in place. CXR shows good lead placement and no pneumothorax.     Stable for discharge to home.  I have personally provided 30 minutes of critical care time concurrently with the resident and  fellow.  This time excludes time spent on separate procedures and time spent teaching. I have reviewed the resident’s  documentation and I agree with the resident’s assessment and plan of care.  PAULINA Doran
Overnight: Sinus pause for 5 seconds at 11 pm. Asymptomatic.    80M PMH HLD who presented to [facility] ED after witnessed syncope episodes, found to have 5 second sinus pause in the ED, admitted to CCU for further management, pending pacemaker placement 10/26.     CARDIOVASCULAR  #Syncope 2/2 sinus pause. Witnessed syncopal episode in ED lasting 5 seconds. Admission EKG w sinus rhythm with PAC. Bedside echo grossly normal biventricular function.  -f/u formal echo   -Pacemaker today 10/26  -If patient has long sinus pause, start transvenous pacing. Pads are in place.      #HLD  -c/w atorvastatin 80 mg qhs       I have personally provided 45 minutes of critical care time concurrently with the resident and  fellow.  This time excludes time spent on separate procedures and time spent teaching. I have reviewed the resident’s  documentation and I agree with the resident’s assessment and plan of care.  PAULINA Doran

## 2020-10-27 NOTE — DISCHARGE NOTE NURSING/CASE MANAGEMENT/SOCIAL WORK - PATIENT PORTAL LINK FT
You can access the FollowMyHealth Patient Portal offered by [facility] by registering at the following website: [email or web address]. By joining [employer]’s FollowMyHealth portal, you will also be able to view your health information using other applications (apps) compatible with our system.

## 2020-10-27 NOTE — PROGRESS NOTE ADULT - SUBJECTIVE AND OBJECTIVE BOX
Brief operative note  Full note to follow    Pre-op Diagnosis:  Sick sinus syndrome with syncope    Post-op Diagnosis:  Same    Procedure:  Implant of a dual chamber pacemaker    Electrophysiologist:  JACQUE Roque MD    Assistant:  PIA Fallon MD    Anesthesia:  Procedural sedation by the anesthesiologist    Access:  L. cephalic vein    Description:  Successful implant of a dual chamber pacemaker  RV lead actively fixed to the mid-RV septum  RA lead actively fixed to the high RA  Excellent pacing and sensing through both leads    Complications:  None    EBL:  < 20 cc    Disposition:  Stable    Plan:  - Bedrest x 2 hrs  - Armrest overnight  - Resume all medications  - Device interrogation, ECG and chest x-ray PA & lateral in the AM
EPS Device interrogation    Indication: Sinus pauses    Device model: SJM Assurity MRI					    Implanting Physician: Dr. Roque    Functioning Mode: DDI 50			    Underlying Rhythm: SR    Pacemaker dependency: No    Battery status: 7-12 years    Lead parameters:   				  RA lead:    Sense:   2.0   mV.              Threshold:    1.0  V at 0.4   ms.           Impedance:   530 ohms  RV lead:    Sense:   7.3  mV.              Threshold:  0.25 V at  0.4 ms.           Impedance:  600 ohms    Events/Alert: None. AP 2.9%,  0%     Normally functioning device as programmed. The incision is healing well - approximated, without oozing or hematoma. Dermabond in place. CXR shows good lead placement and no pneumothorax.     Home care instructions:  - Avoid lifting the left arm above the level of the shoulder for one month. Also avoid pushing, pulling, reaching or lifting anything over 5lbs with the left arm for one month. You may shower, but don't scrub the incision and avoid tub baths and swimming for two weeks or until fully healed. Please call the office with any questions. Follow-up with Dr. Roque in our office on the second floor of [facility] on November 19 at 2pm.
OVERNIGHT EVENTS: Sinus pause for 5 seconds at 11 pm. Asymptomatic.      SUBJECTIVE:  Patient seen and examined at bedside. Denies fatigue, lightheadedness, dizziness, CP, SOB, n/v/d, fatigue, fever/chills, dysuria.     Vital Signs Last 12 Hrs  T(F): 98.5 (10-25-20 @ 10:00), Max: 98.5 (10-25-20 @ 10:00)  HR: 58 (10-25-20 @ 11:30) (48 - 90)  BP: 131/63 (10-25-20 @ 11:30) (89/54 - 131/63)  BP(mean): 90 (10-25-20 @ 11:30) (70 - 90)  RR: 16 (10-25-20 @ 11:30) (16 - 20)  SpO2: 100% (10-25-20 @ 11:30) (94% - 100%)  I&O's Summary    24 Oct 2020 07:01  -  25 Oct 2020 07:00  --------------------------------------------------------  IN: 0 mL / OUT: 400 mL / NET: -400 mL    25 Oct 2020 07:01  -  25 Oct 2020 13:25  --------------------------------------------------------  IN: 450 mL / OUT: 400 mL / NET: 50 mL        PHYSICAL EXAM:  Constitutional: NAD, comfortable in bed.  HEENT: NC/AT, PERRLA, EOMI, no conjunctival pallor or scleral icterus, MMM  Neck: Supple, no JVD  Respiratory: CTA B/L. No w/r/r.   Cardiovascular: RRR, normal S1 and S2, no m/r/g. Pacer pads in place   Gastrointestinal: +BS, soft NTND, no guarding or rebound tenderness, no palpable masses   Extremities: wwp; no cyanosis, clubbing or edema.   Vascular: Pulses equal and strong throughout.   Neurological: AAOx3, no CN deficits, strength and sensation intact throughout.   Skin: No gross skin abnormalities or rashes        LABS:                        13.0   7.31  )-----------( 281      ( 25 Oct 2020 06:37 )             38.9     10-25    141  |  106  |  17  ----------------------------<  82  4.1   |  24  |  1.03    Ca    9.0      25 Oct 2020 06:37  Mg     1.9     10-25    TPro  6.9  /  Alb  4.1  /  TBili  0.6  /  DBili  x   /  AST  19  /  ALT  13  /  AlkPhos  78  10-24      Urinalysis Basic - ( 24 Oct 2020 16:53 )    Color: Yellow / Appearance: Clear / SG: <=1.005 / pH: x  Gluc: x / Ketone: NEGATIVE  / Bili: Negative / Urobili: 0.2 E.U./dL   Blood: x / Protein: NEGATIVE mg/dL / Nitrite: NEGATIVE   Leuk Esterase: NEGATIVE / RBC: x / WBC x   Sq Epi: x / Non Sq Epi: x / Bacteria: x          RADIOLOGY & ADDITIONAL TESTS:    MEDICATIONS  (STANDING):  enoxaparin Injectable 40 milliGRAM(s) SubCutaneous every 24 hours    MEDICATIONS  (PRN):  
OVERNIGHT EVENTS: MILEY. Repeat trop negative.     SUBJECTIVE:  Patient seen and examined at bedside. Denies fatigue, lightheadedness, dizziness, CP, SOB, n/v/d, fatigue, fever/chills, dysuria.     Vital Signs Last 12 Hrs  T(F): 98.5 (10-25-20 @ 10:00), Max: 98.5 (10-25-20 @ 10:00)  HR: 58 (10-25-20 @ 11:30) (48 - 90)  BP: 131/63 (10-25-20 @ 11:30) (89/54 - 131/63)  BP(mean): 90 (10-25-20 @ 11:30) (70 - 90)  RR: 16 (10-25-20 @ 11:30) (16 - 20)  SpO2: 100% (10-25-20 @ 11:30) (94% - 100%)  I&O's Summary    24 Oct 2020 07:01  -  25 Oct 2020 07:00  --------------------------------------------------------  IN: 0 mL / OUT: 400 mL / NET: -400 mL    25 Oct 2020 07:01  -  25 Oct 2020 13:25  --------------------------------------------------------  IN: 450 mL / OUT: 400 mL / NET: 50 mL        PHYSICAL EXAM:  Constitutional: NAD, comfortable in bed.  HEENT: NC/AT, PERRLA, EOMI, no conjunctival pallor or scleral icterus, MMM  Neck: Supple, no JVD  Respiratory: CTA B/L. No w/r/r.   Cardiovascular: RRR, normal S1 and S2, no m/r/g.   Gastrointestinal: +BS, soft NTND, no guarding or rebound tenderness, no palpable masses   Extremities: wwp; no cyanosis, clubbing or edema.   Vascular: Pulses equal and strong throughout.   Neurological: AAOx3, no CN deficits, strength and sensation intact throughout.   Skin: No gross skin abnormalities or rashes        LABS:                        13.0   7.31  )-----------( 281      ( 25 Oct 2020 06:37 )             38.9     10-25    141  |  106  |  17  ----------------------------<  82  4.1   |  24  |  1.03    Ca    9.0      25 Oct 2020 06:37  Mg     1.9     10-25    TPro  6.9  /  Alb  4.1  /  TBili  0.6  /  DBili  x   /  AST  19  /  ALT  13  /  AlkPhos  78  10-24      Urinalysis Basic - ( 24 Oct 2020 16:53 )    Color: Yellow / Appearance: Clear / SG: <=1.005 / pH: x  Gluc: x / Ketone: NEGATIVE  / Bili: Negative / Urobili: 0.2 E.U./dL   Blood: x / Protein: NEGATIVE mg/dL / Nitrite: NEGATIVE   Leuk Esterase: NEGATIVE / RBC: x / WBC x   Sq Epi: x / Non Sq Epi: x / Bacteria: x          RADIOLOGY & ADDITIONAL TESTS:    MEDICATIONS  (STANDING):  enoxaparin Injectable 40 milliGRAM(s) SubCutaneous every 24 hours    MEDICATIONS  (PRN):

## 2020-10-30 PROBLEM — Z00.00 ENCOUNTER FOR PREVENTIVE HEALTH EXAMINATION: Status: ACTIVE | Noted: 2020-10-30

## 2020-11-04 DIAGNOSIS — I49.1 ATRIAL PREMATURE DEPOLARIZATION: ICD-10-CM

## 2020-11-04 DIAGNOSIS — I45.5 OTHER SPECIFIED HEART BLOCK: ICD-10-CM

## 2020-11-04 DIAGNOSIS — E78.5 HYPERLIPIDEMIA, UNSPECIFIED: ICD-10-CM

## 2020-11-04 DIAGNOSIS — I49.5 SICK SINUS SYNDROME: ICD-10-CM

## 2020-11-04 DIAGNOSIS — I34.0 NONRHEUMATIC MITRAL (VALVE) INSUFFICIENCY: ICD-10-CM

## 2020-11-04 DIAGNOSIS — Z79.82 LONG TERM (CURRENT) USE OF ASPIRIN: ICD-10-CM

## 2020-11-04 DIAGNOSIS — Z80.9 FAMILY HISTORY OF MALIGNANT NEOPLASM, UNSPECIFIED: ICD-10-CM

## 2020-11-04 DIAGNOSIS — Z82.0 FAMILY HISTORY OF EPILEPSY AND OTHER DISEASES OF THE NERVOUS SYSTEM: ICD-10-CM

## 2020-11-04 PROCEDURE — 70450 CT HEAD/BRAIN W/O DYE: CPT

## 2020-11-04 PROCEDURE — 84484 ASSAY OF TROPONIN QUANT: CPT

## 2020-11-04 PROCEDURE — 85610 PROTHROMBIN TIME: CPT

## 2020-11-04 PROCEDURE — 85027 COMPLETE CBC AUTOMATED: CPT

## 2020-11-04 PROCEDURE — 81003 URINALYSIS AUTO W/O SCOPE: CPT

## 2020-11-04 PROCEDURE — 80053 COMPREHEN METABOLIC PANEL: CPT

## 2020-11-04 PROCEDURE — 80061 LIPID PANEL: CPT

## 2020-11-04 PROCEDURE — 82962 GLUCOSE BLOOD TEST: CPT

## 2020-11-04 PROCEDURE — 85730 THROMBOPLASTIN TIME PARTIAL: CPT

## 2020-11-04 PROCEDURE — 99285 EMERGENCY DEPT VISIT HI MDM: CPT | Mod: 25

## 2020-11-04 PROCEDURE — 93306 TTE W/DOPPLER COMPLETE: CPT

## 2020-11-04 PROCEDURE — C1898: CPT

## 2020-11-04 PROCEDURE — 71045 X-RAY EXAM CHEST 1 VIEW: CPT

## 2020-11-04 PROCEDURE — 83735 ASSAY OF MAGNESIUM: CPT

## 2020-11-04 PROCEDURE — 83605 ASSAY OF LACTIC ACID: CPT

## 2020-11-04 PROCEDURE — 84100 ASSAY OF PHOSPHORUS: CPT

## 2020-11-04 PROCEDURE — 80048 BASIC METABOLIC PNL TOTAL CA: CPT

## 2020-11-04 PROCEDURE — U0003: CPT

## 2020-11-04 PROCEDURE — C1894: CPT

## 2020-11-04 PROCEDURE — 85025 COMPLETE CBC W/AUTO DIFF WBC: CPT

## 2020-11-04 PROCEDURE — 71046 X-RAY EXAM CHEST 2 VIEWS: CPT

## 2020-11-04 PROCEDURE — C1769: CPT

## 2020-11-04 PROCEDURE — C1785: CPT

## 2020-11-04 PROCEDURE — 86901 BLOOD TYPING SEROLOGIC RH(D): CPT

## 2020-11-04 PROCEDURE — 83036 HEMOGLOBIN GLYCOSYLATED A1C: CPT

## 2020-11-04 PROCEDURE — 86850 RBC ANTIBODY SCREEN: CPT

## 2020-11-04 PROCEDURE — 86900 BLOOD TYPING SEROLOGIC ABO: CPT

## 2020-11-04 PROCEDURE — 36415 COLL VENOUS BLD VENIPUNCTURE: CPT

## 2020-11-19 ENCOUNTER — APPOINTMENT (OUTPATIENT)
Dept: HEART AND VASCULAR | Facility: CLINIC | Age: 80
End: 2020-11-19
Payer: MEDICARE

## 2020-11-19 VITALS
HEART RATE: 79 BPM | DIASTOLIC BLOOD PRESSURE: 78 MMHG | SYSTOLIC BLOOD PRESSURE: 128 MMHG | WEIGHT: 160 LBS | TEMPERATURE: 98.7 F | BODY MASS INDEX: 22.9 KG/M2 | HEIGHT: 70 IN

## 2020-11-19 DIAGNOSIS — I49.5 SICK SINUS SYNDROME: ICD-10-CM

## 2020-11-19 DIAGNOSIS — Z78.9 OTHER SPECIFIED HEALTH STATUS: ICD-10-CM

## 2020-11-19 PROCEDURE — 93280 PM DEVICE PROGR EVAL DUAL: CPT

## 2020-11-24 PROBLEM — Z78.9 DOES NOT USE ILLICIT DRUGS: Status: ACTIVE | Noted: 2020-11-24

## 2020-11-24 PROBLEM — I49.5 SICK SINUS SYNDROME: Status: ACTIVE | Noted: 2020-11-24

## 2020-11-24 RX ORDER — ATORVASTATIN CALCIUM 40 MG/1
40 TABLET, FILM COATED ORAL
Refills: 0 | Status: ACTIVE | COMMUNITY
Start: 2020-11-24

## 2020-11-24 RX ORDER — ASPIRIN ENTERIC COATED TABLETS 81 MG 81 MG/1
81 TABLET, DELAYED RELEASE ORAL
Refills: 0 | Status: ACTIVE | COMMUNITY
Start: 2020-11-24

## 2020-11-25 NOTE — DISCUSSION/SUMMARY
[FreeTextEntry1] : 80 year old male with HLD and syncope with sick sinus syndrome and pauses >6 seconds s/p pacemaker 10/2020, who presents for follow up.  Device incision is well healed  Interrogation reveals normal function and all measured data is within normal limits.  No significant events for review.  He will follow up in 6 months or sooner if needed and knows to call with any questions or concerns. \par

## 2020-11-25 NOTE — REVIEW OF SYSTEMS
[Eyeglasses] : currently wearing eyeglasses [see HPI] : see HPI [Negative] : Psychiatric [Fever] : no fever [Chills] : no chills [Feeling Fatigued] : not feeling fatigued

## 2020-11-25 NOTE — ADDENDUM
[FreeTextEntry1] : I, Michele Roque, hereby attest that the medical record entry for this patient accurately reflects signatures/notations that I made on the Date of Service in my capacity as an Attending Physician when I treated/diagnosed the above patient. I do hereby attest that this information is true, accurate and complete to the best of my knowledge and I understand that any falsification, omission, or concealment of material fact may subject me to administrative, civil, or, criminal liability. \par I was present for the entire visit and supervised the entire visit and agree with the plan as outlined.\par

## 2020-11-25 NOTE — PHYSICAL EXAM
[General Appearance - Well Developed] : well developed [Normal Appearance] : normal appearance [Well Groomed] : well groomed [General Appearance - Well Nourished] : well nourished [No Deformities] : no deformities [General Appearance - In No Acute Distress] : no acute distress [Heart Rate And Rhythm] : heart rate and rhythm were normal [Heart Sounds] : normal S1 and S2 [] : no respiratory distress [Respiration, Rhythm And Depth] : normal respiratory rhythm and effort [Exaggerated Use Of Accessory Muscles For Inspiration] : no accessory muscle use [Clean] : clean [Dry] : dry [Well-Healed] : well-healed [Cyanosis, Localized] : no localized cyanosis [Palpable Crepitus] : no palpable crepitus [Bleeding] : no active bleeding [Foul Odor] : no foul smell [Purulent Drainage] : no purulent drainage [Serosanguineous Drainage] : no serosanquineous drainage [Serous Drainage] : no serous drainage [Erythema] : not erythematous [Warm] : not warm [Tender] : not tender [Indurated] : not indurated [Fluctuant] : not fluctuant

## 2020-11-25 NOTE — HISTORY OF PRESENT ILLNESS
[de-identified] : 80 year old male with HLD and syncope with sick sinus syndrome and pauses >6 seconds s/p pacemaker 10/2020, who presents for follow up.\par \par Since pacemaker implant he "feels great".  No syncope, near syncope, chest pain, palpitations, SOB, orthopnea.  No device related issues.

## 2020-11-25 NOTE — PROCEDURE
[No] : not [NSR] : normal sinus rhythm [Pacemaker] : pacemaker [DDI] : DDI [Threshold Testing Performed] : Threshold testing was performed [Lead Imp:  ___ohms] : lead impedance was [unfilled] ohms [Sensing Amplitude ___mv] : sensing amplitude was [unfilled] mv [___V @] : [unfilled] V [___ ms] : [unfilled] ms [None] : none [de-identified] : RAAD [de-identified] : assurity MRI [de-identified] : [id number] [de-identified] : 10/2020 [de-identified] : 50/200 [de-identified] : 6.8-11.2 years  [de-identified] : AP 19%\par  <1%\par

## 2021-05-20 ENCOUNTER — APPOINTMENT (OUTPATIENT)
Dept: HEART AND VASCULAR | Facility: CLINIC | Age: 81
End: 2021-05-20
Payer: MEDICARE

## 2021-05-20 VITALS
BODY MASS INDEX: 22.62 KG/M2 | WEIGHT: 158 LBS | DIASTOLIC BLOOD PRESSURE: 70 MMHG | SYSTOLIC BLOOD PRESSURE: 151 MMHG | HEIGHT: 70 IN | TEMPERATURE: 98.6 F | HEART RATE: 88 BPM

## 2021-05-20 PROCEDURE — 99212 OFFICE O/P EST SF 10 MIN: CPT | Mod: 25

## 2021-05-20 PROCEDURE — 99072 ADDL SUPL MATRL&STAF TM PHE: CPT

## 2021-05-20 PROCEDURE — 93280 PM DEVICE PROGR EVAL DUAL: CPT

## 2021-05-20 RX ORDER — LISINOPRIL 5 MG/1
5 TABLET ORAL
Refills: 0 | Status: ACTIVE | COMMUNITY
Start: 2021-05-20

## 2021-05-26 NOTE — HISTORY OF PRESENT ILLNESS
[de-identified] : 80 year old male with HLD and syncope with sick sinus syndrome and pauses >6 seconds s/p pacemaker 10/2020, who presents for follow up.\par \par He presents for routine follow up and overall is doing well.  No device related issues.  No chest pain, syncope, near syncope, orthopnea, PND or palpitations.  \par \par

## 2021-05-26 NOTE — REVIEW OF SYSTEMS
[Negative] : Heme/Lymph [Fever] : no fever [Chills] : no chills [Feeling Fatigued] : not feeling fatigued [Cough] : no cough [FreeTextEntry5] : see HPI

## 2021-05-26 NOTE — PROCEDURE
[No] : not [NSR] : normal sinus rhythm [Pacemaker] : pacemaker [DDI] : DDI [Threshold Testing Performed] : Threshold testing was performed [Lead Imp:  ___ohms] : lead impedance was [unfilled] ohms [Sensing Amplitude ___mv] : sensing amplitude was [unfilled] mv [___V @] : [unfilled] V [___ ms] : [unfilled] ms [Outputs/Safety Margin] : output changed to allow for adequate safety margin [de-identified] : RAAD [de-identified] : assurity MRI [de-identified] : [id number] [de-identified] : 10/2020 [de-identified] : 50/200 [de-identified] : 9.7-11.7 years  [de-identified] : AP 19%\par  <1%\par short bursts of AT

## 2021-05-26 NOTE — DISCUSSION/SUMMARY
[FreeTextEntry1] : 80 year old male with HLD and syncope with sick sinus syndrome and pauses >6 seconds s/p pacemaker 10/2020, who presents for follow up.  Device interrogation reveals normal function and all measured data is within normal limits.  SHort bursts of AT that he is asymptomatic from and we will observe this for now.  He will follow up in 6 months or sooner if needed and knows to call with any questions or concerns. \par

## 2021-11-18 ENCOUNTER — APPOINTMENT (OUTPATIENT)
Dept: HEART AND VASCULAR | Facility: CLINIC | Age: 81
End: 2021-11-18
Payer: MEDICARE

## 2021-11-18 VITALS
HEART RATE: 80 BPM | SYSTOLIC BLOOD PRESSURE: 115 MMHG | TEMPERATURE: 96.9 F | HEIGHT: 70 IN | WEIGHT: 165 LBS | DIASTOLIC BLOOD PRESSURE: 64 MMHG | BODY MASS INDEX: 23.62 KG/M2

## 2021-11-18 PROCEDURE — 93280 PM DEVICE PROGR EVAL DUAL: CPT

## 2021-11-26 NOTE — HISTORY OF PRESENT ILLNESS
[de-identified] : 81 year old male with HLD and syncope with sick sinus syndrome and pauses >6 seconds s/p pacemaker 10/2020, who presents for follow up.\par \par He presents for routine follow up and overall is doing well.  No device related complaints.  No chest pain, syncope, near syncope, orthopnea, PND or palpitations.  \par \par

## 2021-11-26 NOTE — PROCEDURE
[No] : not [Pacemaker] : pacemaker [DDI] : DDI [Threshold Testing Performed] : Threshold testing was performed [Lead Imp:  ___ohms] : lead impedance was [unfilled] ohms [Sensing Amplitude ___mv] : sensing amplitude was [unfilled] mv [___V @] : [unfilled] V [___ ms] : [unfilled] ms [2nd Degree Block] : second degree block [None] : none [de-identified] : RAAD [de-identified] : assurity MRI [de-identified] : [id number] [de-identified] : 10/2020 [de-identified] : 50/200 [de-identified] : 9.3-11 years  [de-identified] : AP 2%\par  21%\par short bursts of AT

## 2021-11-26 NOTE — PHYSICAL EXAM
[General Appearance - Well Developed] : well developed [Normal Appearance] : normal appearance [Well Groomed] : well groomed [General Appearance - Well Nourished] : well nourished [No Deformities] : no deformities [General Appearance - In No Acute Distress] : no acute distress [Heart Rate And Rhythm] : heart rate and rhythm were normal [Heart Sounds] : normal S1 and S2 [] : no respiratory distress [Respiration, Rhythm And Depth] : normal respiratory rhythm and effort [Exaggerated Use Of Accessory Muscles For Inspiration] : no accessory muscle use [Clean] : clean [Dry] : dry [Well-Healed] : well-healed [Edema] : no peripheral edema present [Auscultation Breath Sounds / Voice Sounds] : lungs were clear to auscultation bilaterally [Palpable Crepitus] : no palpable crepitus [Bleeding] : no active bleeding [Foul Odor] : no foul smell [Purulent Drainage] : no purulent drainage [Serosanguineous Drainage] : no serosanquineous drainage [Serous Drainage] : no serous drainage [Erythema] : not erythematous [Warm] : not warm [Tender] : not tender [Indurated] : not indurated [Fluctuant] : not fluctuant

## 2021-11-26 NOTE — REVIEW OF SYSTEMS
[Negative] : Respiratory [Fever] : no fever [Chills] : no chills [Feeling Fatigued] : not feeling fatigued [Cough] : no cough [FreeTextEntry5] : see HPI

## 2021-11-26 NOTE — DISCUSSION/SUMMARY
[FreeTextEntry1] : 81 year old male with HLD and syncope with sick sinus syndrome and pauses >6 seconds s/p pacemaker 10/2020, who presents for follow up.  Device interrogation reveals normal function and all measured data is within normal limits.  SHort bursts of AT that he is asymptomatic from and we will observe this for now.  He will follow up in 6 months or sooner if needed and knows to call with any questions or concerns. \par

## 2022-08-04 NOTE — CONSULT NOTE ADULT - PROBLEM SELECTOR PROBLEM 1
Quality 111:Pneumonia Vaccination Status For Older Adults: Pneumococcal Vaccination not Administered or Previously Received, Reason not Otherwise Specified Quality 110: Preventive Care And Screening: Influenza Immunization: Influenza Immunization Ordered or Recommended, but not Administered due to system reason Syncope Detail Level: Detailed Quality 130: Documentation Of Current Medications In The Medical Record: Current Medications Documented Quality 431: Preventive Care And Screening: Unhealthy Alcohol Use - Screening: Patient screened for unhealthy alcohol use using a single question and scores less than 2 times per year Quality 226: Preventive Care And Screening: Tobacco Use: Screening And Cessation Intervention: Patient screened for tobacco use and is an ex/non-smoker